# Patient Record
Sex: MALE | Race: WHITE | NOT HISPANIC OR LATINO | URBAN - METROPOLITAN AREA
[De-identification: names, ages, dates, MRNs, and addresses within clinical notes are randomized per-mention and may not be internally consistent; named-entity substitution may affect disease eponyms.]

---

## 2019-05-31 ENCOUNTER — COMMUNICATION - HEALTHEAST (OUTPATIENT)
Dept: TELEHEALTH | Facility: CLINIC | Age: 29
End: 2019-05-31

## 2019-05-31 ENCOUNTER — RECORDS - HEALTHEAST (OUTPATIENT)
Dept: GENERAL RADIOLOGY | Facility: CLINIC | Age: 29
End: 2019-05-31

## 2019-05-31 ENCOUNTER — OFFICE VISIT - HEALTHEAST (OUTPATIENT)
Dept: INTERNAL MEDICINE | Facility: CLINIC | Age: 29
End: 2019-05-31

## 2019-05-31 DIAGNOSIS — D72.819 LEUKOPENIA, UNSPECIFIED TYPE: ICD-10-CM

## 2019-05-31 DIAGNOSIS — S69.91XA UNSPECIFIED INJURY OF RIGHT WRIST, HAND AND FINGER(S), INITIAL ENCOUNTER: ICD-10-CM

## 2019-05-31 DIAGNOSIS — S69.91XA INJURY OF RIGHT THUMB, INITIAL ENCOUNTER: ICD-10-CM

## 2019-05-31 DIAGNOSIS — N49.2 NODULE OF SCROTUM: ICD-10-CM

## 2019-05-31 DIAGNOSIS — Z00.00 ROUTINE GENERAL MEDICAL EXAMINATION AT A HEALTH CARE FACILITY: ICD-10-CM

## 2019-05-31 LAB
ANION GAP SERPL CALCULATED.3IONS-SCNC: 9 MMOL/L (ref 5–18)
BUN SERPL-MCNC: 16 MG/DL (ref 8–22)
CALCIUM SERPL-MCNC: 9.8 MG/DL (ref 8.5–10.5)
CHLORIDE BLD-SCNC: 101 MMOL/L (ref 98–107)
CO2 SERPL-SCNC: 28 MMOL/L (ref 22–31)
CREAT SERPL-MCNC: 0.93 MG/DL (ref 0.7–1.3)
ERYTHROCYTE [DISTWIDTH] IN BLOOD BY AUTOMATED COUNT: 11.2 % (ref 11–14.5)
GFR SERPL CREATININE-BSD FRML MDRD: >60 ML/MIN/1.73M2
GLUCOSE BLD-MCNC: 79 MG/DL (ref 70–125)
HCT VFR BLD AUTO: 43.6 % (ref 40–54)
HGB BLD-MCNC: 14.7 G/DL (ref 14–18)
HIV 1+2 AB+HIV1 P24 AG SERPL QL IA: NEGATIVE
MCH RBC QN AUTO: 30.7 PG (ref 27–34)
MCHC RBC AUTO-ENTMCNC: 33.6 G/DL (ref 32–36)
MCV RBC AUTO: 91 FL (ref 80–100)
PLATELET # BLD AUTO: 160 THOU/UL (ref 140–440)
PMV BLD AUTO: 8.1 FL (ref 7–10)
POTASSIUM BLD-SCNC: 4.2 MMOL/L (ref 3.5–5)
RBC # BLD AUTO: 4.78 MILL/UL (ref 4.4–6.2)
SODIUM SERPL-SCNC: 138 MMOL/L (ref 136–145)
WBC: 3.6 THOU/UL (ref 4–11)

## 2019-05-31 ASSESSMENT — MIFFLIN-ST. JEOR: SCORE: 1796.77

## 2019-06-01 LAB — T PALLIDUM AB SER QL: NEGATIVE

## 2019-06-02 LAB — HBV SURFACE AG SERPL QL IA: NEGATIVE

## 2019-06-03 LAB
C TRACH DNA SPEC QL PROBE+SIG AMP: NEGATIVE
HCV AB SERPL QL IA: NEGATIVE
HEPATITIS B SURFACE ANTIBODY LHE- HISTORICAL: POSITIVE
N GONORRHOEA DNA SPEC QL NAA+PROBE: NEGATIVE

## 2019-06-04 ENCOUNTER — HOSPITAL ENCOUNTER (OUTPATIENT)
Dept: ULTRASOUND IMAGING | Facility: CLINIC | Age: 29
Discharge: HOME | End: 2019-06-04

## 2019-06-04 DIAGNOSIS — N49.2 NODULE OF SCROTUM: ICD-10-CM

## 2019-06-05 ENCOUNTER — COMMUNICATION - HEALTHEAST (OUTPATIENT)
Dept: INTERNAL MEDICINE | Facility: CLINIC | Age: 29
End: 2019-06-05

## 2019-06-10 ENCOUNTER — RECORDS - HEALTHEAST (OUTPATIENT)
Dept: ADMINISTRATIVE | Facility: OTHER | Age: 29
End: 2019-06-10

## 2021-05-29 NOTE — PROGRESS NOTES
"[facility] Note    Patient Name: Jordan Aguilar  Patient Age: 28 y.o.  YOB: 1990  MRN: [id number]    Date of visit: 5/31/2019    Assessment/Plan:  No results found for this or any previous visit (from the past 24 hour(s)).  No medications were ordered this encounter      ICD-10-CM    1. Nodule of scrotum N49.2    2. Routine general medical examination at a health care facility Z00.00    3. Injury of right thumb, initial encounter S69.91XA        We discussed safe sex and lifestyle.  We will get an x-ray of the right thumb I do not suspect fracture or significant ligamentous injury however.  If not improving he should be seen again.  I am not sure what the nodule is near his scrotum, I do not think it is anything pathologic.  However, will get an ultrasound to give reassurance.  I recommend that if it is changing at all he let us know.      Counseled patient regarding treatments, treatment options, risks and benefits and diagnosis.  The patient was interactive, attentive, verbalized understanding, and we discussed plan.       Patient Active Problem List   Diagnosis     Health care maintenance     Social History     Social History Narrative     Not on file     No family history on file.  No outpatient encounter medications on file as of 5/31/2019.     No facility-administered encounter medications on file as of 5/31/2019.        Chief Complaint:   Chief Complaint   Patient presents with     Annual Exam     Bone in the rib cage      Finger Injury       /74 (Patient Site: Left Arm, Patient Position: Sitting, Cuff Size: Adult Regular)   Pulse 66   Ht 6' 1\" (1.854 m)   Wt 174 lb 12.8 oz (79.3 kg)   SpO2 97%   BMI 23.06 kg/m    HPI:   Occupation: track, grad school   Marital status:yes  Number of children:no  Living situation:roomate  Diet:good  Exercise:lots  Alcohol use:very little  Tobacco use:no  Illicit drug use:no  Depression:no  Last tetanus:2015  HIV testing:will get. " "  Lipids/glucose:ok  Blood pressure:ok  Mother/Father/Siblings MI:no  Concern for abuse or neglect:no  Fam hx colon cancer or prostate or kidney dz  Last visit to dentist:goes  Optometrist:not recent    Uses condoms regularly.        Wt Readings from Last 3 Encounters:   05/31/19 174 lb 12.8 oz (79.3 kg)     BP Readings from Last 3 Encounters:   05/31/19 118/74               Monday was running and fell, jammed right thumb. Slight tender at mcp joint.  No pain unless moves it certain way.     STD testing - 40 lifetime partners.  No symptoms.  Women.      Has noticed a nodule just behind the scrotum, it has not changed in the last 12 months that he knows of.  Not painful, asymptomatic.    Notices rib sticks out in abdomen, this is unchanged for a long time.    ROS: Pertinent ros findings in hpi, all other systems negative.    Objective/Physical Exam:     /74 (Patient Site: Left Arm, Patient Position: Sitting, Cuff Size: Adult Regular)   Pulse 66   Ht 6' 1\" (1.854 m)   Wt 174 lb 12.8 oz (79.3 kg)   SpO2 97%   BMI 23.06 kg/m      Gen: NAD, conversant, appears age, well-kempt  Skin: warm, dry, no rash, pallor cyanosis  HENT: normocephalic atraumatic, MMM, no oral lesions, otorrhea, rhinorrhea. TM's normal bilaterally.  Eyes: non-icteric, extra-ocular movements intact, PERRL, conjunctivae not injected. Holding eyes open comfortably, no drainage.  CV: NRRR no m/r/g, no peripheral edema. no JVD.  Resp: CTAB no w/r/r, normal respiratory effort  GI: soft, non-tender, non-distended. No masses.  MSK: no muscle or joint swelling.  Neuro: no dysarthria or gross asymmetry  Psych: full affect, oriented x 3  Lymph: No significant cervical lymphadenopathy  Hematologic: No petechiae or purpura.    Right thumb excellent range of motion without significant pain.  No swelling of the thumb.  5 out of 5 strength in each direction.  Mildly tender to palpation at the MCP joint radial aspect, no instability.    Normal male external " genitalia but there is a hard mobile nodule just posterior of the scrotum about 3 or 4 mm in diameter.    Normal abdominal exam, ribs protrude somewhat due to body habitus.      Mohit Mitchell MD

## 2021-05-29 NOTE — PATIENT INSTRUCTIONS - HE
Whole Foods, Plant-Based    Abundant vegetables.    Only whole grains.    2-4 fruits/day.    Avoid animal products such as dairy, eggs and meat. (instead, take a vitamin b12 supplement)    Avoid sugars, oils and other processed foods.    Documentary: [place] [employer]     Web: Nutritionstudies.org, Nutritionfacts.org    Books: How Not to Die (Bk), The [place] [address] Study (Anup)

## 2021-06-03 VITALS — BODY MASS INDEX: 23.17 KG/M2 | WEIGHT: 174.8 LBS | HEIGHT: 73 IN

## 2021-06-19 NOTE — LETTER
Letter by Mohit Mitchell MD at      Author: Mohit Mitchell MD Service: -- Author Type: --    Filed:  Encounter Date: 6/5/2019 Status: (Other)         Jordan Aguilar  [address]  [place] [address]             June 5, 2019         Dear Mr. Aguilar,    Below are the results from your recent visit:    Resulted Orders   US Scrotum and Testicles W Duplex Ltd    Narrative    EXAM: US SCROTUM AND TESTICLES WITH DUPLEX LIMITED  LOCATION: [facility]  DATE/TIME: 6/4/2019 4:17 PM    INDICATION: Palpable scrotal nodule  COMPARISON: None.  TECHNIQUE: Ultrasound of scrotum with duplex utilizing 2D gray-scale imaging, Doppler interrogation with color-flow and spectral waveform analysis.    FINDINGS:  RIGHT: Right testicle measures 5 x 2.2 x 3.6 cm. Normal testicle with no masses. Normal arterial duplex and normal color flow. Normal epididymis. No hydrocele.    LEFT: Left testicle measures 5.2 x 2.4 x 3.2 cm. Normal testicle with no masses. Normal arterial duplex and normal color flow. Normal epididymis. No hydrocele.    There is a 4 mm nodule in the inferior scrotum, just deep to the skin and separate from the testicles and epididymides. This corresponds to the palpable finding.      Impression    CONCLUSION:  1.  4 mm extratesticular nodule corresponds to the palpable finding. This is of uncertain etiology but has a benign appearance. If desired, follow-up in 6 months would confirm stability.       Good news, this appears to be benign.  If you would like, can re-ultrasound in 6 months.  Can do sooner if changing.    Please call with questions or contact us using [employer].    Sincerely,        Electronically signed by Mohti Mitchell MD

## 2021-07-03 NOTE — ADDENDUM NOTE
Addendum Note by Moose Mitchell MD at 5/31/2019  2:25 PM     Author: Mooes Mitchell MD Service: -- Author Type: Physician    Filed: 5/31/2019  8:29 PM Encounter Date: 5/31/2019 Status: Signed    : Moose Mitchell MD (Physician)    Addended by: MOOSE MITCHELL on: 5/31/2019 08:29 PM        Modules accepted: Orders

## 2021-07-03 NOTE — ADDENDUM NOTE
Addendum Note by Moose Mitchell MD at 5/31/2019  2:25 PM     Author: Moose Mitchell MD Service: -- Author Type: Physician    Filed: 6/3/2019  2:13 PM Encounter Date: 5/31/2019 Status: Signed    : Moose Mitchell MD (Physician)    Addended by: MOOSE MITCHELL on: 6/3/2019 02:13 PM        Modules accepted: Orders

## 2021-08-01 ENCOUNTER — HEALTH MAINTENANCE LETTER (OUTPATIENT)
Age: 31
End: 2021-08-01

## 2021-09-26 ENCOUNTER — HEALTH MAINTENANCE LETTER (OUTPATIENT)
Age: 31
End: 2021-09-26

## 2022-08-28 ENCOUNTER — HEALTH MAINTENANCE LETTER (OUTPATIENT)
Age: 32
End: 2022-08-28

## 2023-01-08 ENCOUNTER — HEALTH MAINTENANCE LETTER (OUTPATIENT)
Age: 33
End: 2023-01-08

## 2023-09-24 ENCOUNTER — HEALTH MAINTENANCE LETTER (OUTPATIENT)
Age: 33
End: 2023-09-24